# Patient Record
Sex: FEMALE | Race: WHITE | NOT HISPANIC OR LATINO | Employment: UNEMPLOYED | URBAN - METROPOLITAN AREA
[De-identification: names, ages, dates, MRNs, and addresses within clinical notes are randomized per-mention and may not be internally consistent; named-entity substitution may affect disease eponyms.]

---

## 2021-05-18 PROBLEM — O09.512 AMA (ADVANCED MATERNAL AGE) PRIMIGRAVIDA 35+, SECOND TRIMESTER: Status: ACTIVE | Noted: 2021-05-18

## 2021-09-13 PROBLEM — N89.8 VAGINAL DISCHARGE DURING PREGNANCY: Status: ACTIVE | Noted: 2021-09-13

## 2021-09-13 PROBLEM — O26.899 VAGINAL DISCHARGE DURING PREGNANCY: Status: ACTIVE | Noted: 2021-09-13

## 2021-09-15 PROBLEM — O42.90 LEAKAGE OF AMNIOTIC FLUID: Status: ACTIVE | Noted: 2021-09-13

## 2024-01-12 DIAGNOSIS — O09.512 AMA (ADVANCED MATERNAL AGE) PRIMIGRAVIDA 35+, SECOND TRIMESTER: Primary | ICD-10-CM

## 2024-03-08 ENCOUNTER — OFFICE VISIT (OUTPATIENT)
Dept: URGENT CARE | Facility: CLINIC | Age: 39
End: 2024-03-08
Payer: COMMERCIAL

## 2024-03-08 VITALS
TEMPERATURE: 98 F | RESPIRATION RATE: 16 BRPM | HEART RATE: 88 BPM | HEIGHT: 62 IN | SYSTOLIC BLOOD PRESSURE: 106 MMHG | DIASTOLIC BLOOD PRESSURE: 62 MMHG | OXYGEN SATURATION: 98 % | BODY MASS INDEX: 27.6 KG/M2 | WEIGHT: 150 LBS

## 2024-03-08 DIAGNOSIS — J02.9 SORE THROAT: Primary | ICD-10-CM

## 2024-03-08 DIAGNOSIS — J01.90 ACUTE BACTERIAL SINUSITIS: ICD-10-CM

## 2024-03-08 DIAGNOSIS — Z3A.36 36 WEEKS GESTATION OF PREGNANCY: ICD-10-CM

## 2024-03-08 DIAGNOSIS — R05.1 ACUTE COUGH: ICD-10-CM

## 2024-03-08 DIAGNOSIS — B96.89 ACUTE BACTERIAL SINUSITIS: ICD-10-CM

## 2024-03-08 PROBLEM — Z34.90 PREGNANCY: Status: ACTIVE | Noted: 2021-01-18

## 2024-03-08 PROBLEM — B00.1 HERPES LABIALIS: Status: ACTIVE | Noted: 2021-05-17

## 2024-03-08 LAB
CTP QC/QA: YES
FLUAV AG NPH QL: NEGATIVE
FLUBV AG NPH QL: NEGATIVE
S PYO RRNA THROAT QL PROBE: NEGATIVE
SARS-COV-2 AG RESP QL IA.RAPID: NEGATIVE

## 2024-03-08 PROCEDURE — 99204 OFFICE O/P NEW MOD 45 MIN: CPT | Mod: S$GLB,,, | Performed by: NURSE PRACTITIONER

## 2024-03-08 PROCEDURE — 87804 INFLUENZA ASSAY W/OPTIC: CPT | Mod: QW,,, | Performed by: NURSE PRACTITIONER

## 2024-03-08 PROCEDURE — 87811 SARS-COV-2 COVID19 W/OPTIC: CPT | Mod: QW,S$GLB,, | Performed by: NURSE PRACTITIONER

## 2024-03-08 PROCEDURE — 87880 STREP A ASSAY W/OPTIC: CPT | Mod: QW,,, | Performed by: NURSE PRACTITIONER

## 2024-03-08 RX ORDER — AMOXICILLIN 875 MG/1
875 TABLET, FILM COATED ORAL EVERY 12 HOURS
Qty: 20 TABLET | Refills: 0 | Status: SHIPPED | OUTPATIENT
Start: 2024-03-08 | End: 2024-03-18

## 2024-03-08 NOTE — PROGRESS NOTES
"Subjective:      Patient ID: Brittany Serpas Favre is a 39 y.o. female.    Vitals:  height is 5' 2" (1.575 m) and weight is 68 kg (150 lb). Her temperature is 98.1 °F (36.7 °C). Her blood pressure is 106/62 and her pulse is 88. Her respiration is 16 and oxygen saturation is 98%.     Chief Complaint: Sore Throat    Onset symptoms 2 weeks      Sore Throat   This is a new problem. Episode onset: x 2 days. The problem has been unchanged. Associated symptoms include congestion (Right greater than left) and coughing. Pertinent negatives include no ear discharge, ear pain (Congestion) or shortness of breath. She has tried acetaminophen for the symptoms. The treatment provided no relief.       Constitution: Negative for appetite change, chills, fatigue and fever.   HENT:  Positive for congestion (Right greater than left) and sore throat. Negative for ear pain (Congestion) and ear discharge.    Respiratory:  Positive for cough. Negative for chest tightness, sputum production, shortness of breath and wheezing.    Skin:  Negative for rash.      Objective:     Physical Exam   Constitutional: She is oriented to person, place, and time. She is cooperative.  Non-toxic appearance. She does not appear ill. No distress. awake  HENT:   Head: Normocephalic and atraumatic.   Ears:   Right Ear: Tympanic membrane, external ear and ear canal normal.   Left Ear: Tympanic membrane, external ear and ear canal normal.   Nose: Rhinorrhea and congestion present.   Mouth/Throat: Mucous membranes are moist. Posterior oropharyngeal erythema present. No oropharyngeal exudate.   Eyes: Conjunctivae are normal. Right eye exhibits no discharge. Left eye exhibits no discharge.   Neck: Neck supple. No neck rigidity present.   Cardiovascular: Normal rate, regular rhythm and normal heart sounds.   Pulmonary/Chest: Effort normal and breath sounds normal. No accessory muscle usage. No tachypnea. No respiratory distress. She has no wheezes. She has no rhonchi. " She has no rales. She exhibits no tenderness.   Abdominal: Normal appearance.      Comments: Gravid abdomen at 36 weeks   Musculoskeletal:      Cervical back: She exhibits no tenderness.   Lymphadenopathy:     She has no cervical adenopathy.   Neurological: no focal deficit. She is alert and oriented to person, place, and time. No sensory deficit.   Skin: Skin is warm, dry, not diaphoretic and no rash. Capillary refill takes 2 to 3 seconds.   Psychiatric: Her behavior is normal. Mood normal.   Nursing note and vitals reviewed.      Assessment:     1. Sore throat    2. Acute cough    3. 36 weeks gestation of pregnancy    4. Acute bacterial sinusitis      COVID negative  Strep A negative  Flu a/B negative    Plan:       Sore throat  -     SARS Coronavirus 2 Antigen, POCT Manual Read  -     POCT rapid strep A  -     POCT Influenza A/B Rapid Antigen    Acute cough    36 weeks gestation of pregnancy    Acute bacterial sinusitis  -     amoxicillin (AMOXIL) 875 MG tablet; Take 1 tablet (875 mg total) by mouth every 12 (twelve) hours. for 10 days  Dispense: 20 tablet; Refill: 0

## 2024-03-08 NOTE — PATIENT INSTRUCTIONS
Amoxicillin twice a day for 10 days  Please take a probiotic while you are on antibiotics plus a few weeks. Examples of probiotics include brand names such as Align, Floraster and Culturelle, but generic versions are ok too. Kefir is a milk product that is also an excellent probiotic and can be taken as 1/4 cup three times a day with meals.      Guaifenesin/plain Mucinex in the blue box twice a day for 10 days  Tylenol over-the-counter as directed for pain/discomfort.    Contact your obstetrician for any other medications that you may desire to take before you take them to find out if safe in pregnancy  Nasal saline spray

## 2024-05-02 ENCOUNTER — PATIENT MESSAGE (OUTPATIENT)
Dept: URGENT CARE | Facility: CLINIC | Age: 39
End: 2024-05-02
Payer: COMMERCIAL

## 2025-05-23 ENCOUNTER — HOSPITAL ENCOUNTER (OUTPATIENT)
Dept: RADIOLOGY | Facility: CLINIC | Age: 40
Discharge: HOME | End: 2025-05-23
Attending: PODIATRIST
Payer: COMMERCIAL

## 2025-05-23 ENCOUNTER — OFFICE VISIT (OUTPATIENT)
Dept: PODIATRY | Facility: CLINIC | Age: 40
End: 2025-05-23
Payer: COMMERCIAL

## 2025-05-23 VITALS — WEIGHT: 110.69 LBS | RESPIRATION RATE: 18 BRPM | BODY MASS INDEX: 20.37 KG/M2 | HEIGHT: 62 IN

## 2025-05-23 DIAGNOSIS — M79.671 RIGHT FOOT PAIN: ICD-10-CM

## 2025-05-23 DIAGNOSIS — M21.622 TAILOR'S BUNION OF BOTH FEET: ICD-10-CM

## 2025-05-23 DIAGNOSIS — M21.621 TAILOR'S BUNION OF BOTH FEET: ICD-10-CM

## 2025-05-23 DIAGNOSIS — M21.612 BILATERAL BUNIONS: Primary | ICD-10-CM

## 2025-05-23 DIAGNOSIS — M21.611 BILATERAL BUNIONS: Primary | ICD-10-CM

## 2025-05-23 PROCEDURE — 1159F MED LIST DOCD IN RCRD: CPT | Mod: CPTII,S$GLB,, | Performed by: PODIATRIST

## 2025-05-23 PROCEDURE — 99999 PR PBB SHADOW E&M-EST. PATIENT-LVL III: CPT | Mod: PBBFAC,,, | Performed by: PODIATRIST

## 2025-05-23 PROCEDURE — 73630 X-RAY EXAM OF FOOT: CPT | Mod: RT,S$GLB,, | Performed by: RADIOLOGY

## 2025-05-23 PROCEDURE — 3008F BODY MASS INDEX DOCD: CPT | Mod: CPTII,S$GLB,, | Performed by: PODIATRIST

## 2025-05-23 PROCEDURE — 99203 OFFICE O/P NEW LOW 30 MIN: CPT | Mod: S$GLB,,, | Performed by: PODIATRIST

## 2025-05-23 PROCEDURE — 1160F RVW MEDS BY RX/DR IN RCRD: CPT | Mod: CPTII,S$GLB,, | Performed by: PODIATRIST

## 2025-05-23 RX ORDER — VALACYCLOVIR HYDROCHLORIDE 1 G/1
1000 TABLET, FILM COATED ORAL 3 TIMES DAILY
COMMUNITY

## 2025-05-23 NOTE — PROGRESS NOTES
"  [address] [facility] [address] [place] [address]  Phone: [phone]   [phone]    Patient's PCP:Siobhan, Primary Doctor  Referring Provider: Aaarefsantosh Self    Subjective:      Chief Complaint:: Bunions (Bilateral bunions right foot pain)    HPI Brittany Serpas Favre is a 40 y.o. female who presents today with a complaint of bilateral bunions worse on right foot. The current episode started three toe four moths pain increasing over the last month.  The symptoms include bone protrusion base of great toes, pain ranging from aching to sharp throbbing.  The symptoms are aggravated by time of day, prolonged use and shoe gear. The problem has worsened. Treatment to date have included spacers and new shoes which provided some relief.       Vitals:    25 0922   Resp: 18   Weight: 50.2 kg (110 lb 10.7 oz)   Height: 5' 2" (1.575 m)   PainSc:   7      Shoe Size: 6.5    Past Surgical History:   Procedure Laterality Date     SECTION N/A 2024    Procedure:  SECTION;  Surgeon: Kirby Oneal MD;  Location: [facility] L&D;  Service: OB/GYN;  Laterality: N/A;     Past Medical History:   Diagnosis Date    PPH (postpartum hemorrhage)      No family history on file.     Social History:   Marital Status:   Alcohol History:  reports that she does not currently use alcohol.  Tobacco History:  reports that she has never smoked. She has never used smokeless tobacco.  Drug History:  reports no history of drug use.    Review of patient's allergies indicates:  No Known Allergies    Current Medications[1]    Review of Systems   Constitutional:  Negative for chills, fatigue, fever and unexpected weight change.   HENT:  Negative for hearing loss and trouble swallowing.    Eyes:  Negative for photophobia and visual disturbance.   Respiratory:  Negative for cough, shortness of breath and wheezing.    Cardiovascular:  Negative for chest pain, palpitations and leg swelling.   Gastrointestinal:  Negative for " abdominal pain and nausea.   Genitourinary:  Negative for dysuria and frequency.   Musculoskeletal:  Negative for arthralgias, back pain, gait problem, joint swelling and myalgias.   Skin:  Negative for rash.   Neurological:  Negative for tremors, seizures, speech difficulty, weakness and headaches.   Hematological:  Does not bruise/bleed easily.         Objective:        Physical Exam:   Foot Exam    General  General Appearance: appears stated age and healthy   Orientation: alert and oriented to person, place, and time   Affect: appropriate   Gait: unimpaired       Right Foot/Ankle     Inspection and Palpation  Ecchymosis: none  Tenderness: great toe metatarsophalangeal joint   Swelling: none   Arch: normal  Hallux valgus: yes (Moderate)  Skin Exam: skin intact;   Neurovascular  Dorsalis pedis: 2+  Posterior tibial: 2+  Capillary Refill: 2+  Varicose veins: not present  Saphenous nerve sensation: normal  Tibial nerve sensation: normal  Superficial peroneal nerve sensation: normal  Deep peroneal nerve sensation: normal  Sural nerve sensation: normal    Edema  Type of edema: non-pitting    Muscle Strength  Ankle dorsiflexion: 5  Ankle plantar flexion: 5  Ankle inversion: 5  Ankle eversion: 5  Great toe extension: 5  Great toe flexion: 5    Range of Motion    Normal right ankle ROM    Tests  Anterior drawer: negative   Talar tilt: negative   PT Tinel's sign: negative    Paresthesia: negative  Comments  Moderate bunion deformity.  Great toe is laterally deviated abutting the 2nd toe.  Mildly tender to palpation.  No crepitus.  Not track bound.    Mild tailor's bunion deformity    Left Foot/Ankle      Inspection and Palpation  Ecchymosis: none  Tenderness: great toe metatarsophalangeal joint   Swelling: none   Arch: normal  Hallux valgus: yes (Moderate)  Skin Exam: skin intact;   Neurovascular  Dorsalis pedis: 2+  Posterior tibial: 2+  Capillary refill: 2+  Varicose veins: not present  Saphenous nerve sensation:  normal  Tibial nerve sensation: normal  Superficial peroneal nerve sensation: normal  Deep peroneal nerve sensation: normal  Sural nerve sensation: normal    Edema  Type of edema: non-pitting    Muscle Strength  Ankle dorsiflexion: 5  Ankle plantar flexion: 5  Ankle inversion: 5  Ankle eversion: 5  Great toe extension: 5  Great toe flexion: 5    Range of Motion    Normal left ankle ROM    Tests  Anterior drawer: negative   Talar tilt: negative   PT Tinel's sign: negative  Paresthesia: negative  Comments  Moderate bunion deformity.  Great toe is laterally deviated abutting the 2nd toe.  Mildly tender to palpation.  No crepitus.  Not track bound.    Mild tailor's bunion deformity    Physical Exam  Cardiovascular:      Pulses:           Dorsalis pedis pulses are 2+ on the right side and 2+ on the left side.        Posterior tibial pulses are 2+ on the right side and 2+ on the left side.   Musculoskeletal:      Right foot: Bunion (Moderate) present.      Left foot: Bunion (Moderate) present.               Right Ankle/Foot Exam     Tenderness   The patient is tender to palpation of the great toe metatarsophalangeal joint.    Range of Motion   The patient has normal right ankle ROM.    Comments:  Moderate bunion deformity.  Great toe is laterally deviated abutting the 2nd toe.  Mildly tender to palpation.  No crepitus.  Not track bound.    Mild tailor's bunion deformity    Left Ankle/Foot Exam     Tenderness   The patient is tender to palpation of the great toe metatarsophalangeal joint.    Range of Motion   The patient has normal left ankle ROM.     Comments:  Moderate bunion deformity.  Great toe is laterally deviated abutting the 2nd toe.  Mildly tender to palpation.  No crepitus.  Not track bound.    Mild tailor's bunion deformity      Muscle Strength   Right Lower Extremity   Ankle Dorsiflexion:  5   Plantar flexion:  5/5  Left Lower Extremity   Ankle Dorsiflexion:  5   Plantar flexion:  5/5     Vascular Exam      Right Pulses  Dorsalis Pedis:      2+  Posterior Tibial:      2+        Left Pulses  Dorsalis Pedis:      2+  Posterior Tibial:      2+           Imaging: X-Ray Foot Complete Right  EXAMINATION:  XR FOOT COMPLETE 3 VIEW RIGHT    CLINICAL HISTORY:  . Pain in right foot    TECHNIQUE:  AP, lateral, and oblique views of the right foot were performed.    COMPARISON:  None    FINDINGS:  No displaced fracture.  Hallux valgus.  Preserved joint spaces.    Electronically signed by: Keaton Rothman  Date:    05/23/2025  Time:    09:57               Assessment:       1. Bilateral bunions    2. Right foot pain    3. Tailor's bunion of both feet      Plan:   Bilateral bunions  -     X-Ray Foot Complete Right    Right foot pain  -     X-Ray Foot Complete Right    Tailor's bunion of both feet      Follow up if symptoms worsen or fail to improve.    Procedures        I provided patient education regarding: Bunion deformity and causes. I discussed conservative treatment with shoe modification, pads, treating symptoms with OTC NSAIDs, pads between toes, inserts and pads over the bunion. I explained that conservative treatment is non-curative but may help with pain and slow down the progression of the deformity.     Tailor's bunion: I discussed the deformity of the 5th mpj and 5th metatarsal with curvature of the little toe, prominence of the 5th metatarsal head and widening of the forefoot.  I discussed conservative treatment of wider shoes, NSAIDs, soaks, ice, heat, pads and wider shoes.  I discussed surgical treatment iwth either partial 5th metatarsal head resection, or osteotomy of 5th metatarsal with follow up and possible complications.      Counseling:     I provided patient education verbally regarding:   Patient diagnosis, treatment options, as well as alternatives, risks, and benefits.     This note was created using Dragon voice recognition software that occasionally misinterpreted phrases or words.                       [1]   Current Outpatient Medications   Medication Sig Dispense Refill    ibuprofen (ADVIL,MOTRIN) 800 MG tablet Take 1 tablet (800 mg total) by mouth every 8 (eight) hours. (Patient not taking: Reported on 5/23/2025) 20 tablet 0    oxyCODONE-acetaminophen (PERCOCET)  mg per tablet Take 1 tablet by mouth every 6 (six) hours as needed for Pain. (Patient not taking: Reported on 5/23/2025) 20 tablet 0    prenatal vit/iron fum/folic ac (PRENATAL 1+1 ORAL) Take by mouth. (Patient not taking: Reported on 5/23/2025)      valACYclovir (VALTREX) 1000 MG tablet Take 1,000 mg by mouth 3 (three) times daily.       No current facility-administered medications for this visit.

## 2025-05-26 NOTE — PATIENT INSTRUCTIONS
Bunion    You have a bunion. This is a bony bump at the base of your big toe, along the inside edge of your foot. As the bump gets bigger, it can become red, swollen, and painful with shoe wear.  Bunions may occur if you wear shoes that are too tight and pinch your toes together. High heels may make this worse. In some cases a bunion is due to poor alignment of the foot and ankle. This puts extra weight on the instep of each foot.  Once a bunion forms, it changes the way weight is spread all across your foot. This causes the bunion to get worse over time. The big toe will bend more and more toward the other toes.  A minor bunion can be treated by:  Wearing properly fitting shoes  Using bunion pads  Wearing shoe inserts, called orthotics, to better align the foot and ankle  Physical therapy with ultrasound or whirlpool baths can ease pain, redness, and swelling. Severe cases may require surgery. If you dont treat what is causing the bunion, it may get larger and more painful.  Home care  Limit high heels. These shoes force your foot forward, crowding the toes together.  Switch to comfortable shoes with a wide toe area. Or have your existing shoes stretched by a shoe repair shop.  Avoid shoes that are tight, narrow, or pointed.  If you are flat-footed, using arch supports may help prevent further deformity. The best shoe inserts are the ones custom made by a foot specialist, called a podiatrist, or other healthcare provider.  Put a bunion pad over the bunion to ease pressure of your shoe against the bunion. You can buy these pads at most pharmacies without a prescription  To reduce pain and swelling, apply an ice pack over the injured area for 15 to 20 minutes. Do this every 1 to 2 hours the first day. Keep using ice 3 to 4 times a day until the pain and swelling goes away.  To make an ice pack, put ice cubes in a sealed zip-lock plastic bag. Wrap the bag in a clean, thin towel or cloth. Never put ice or an ice pack  "directly on the skin.  You may use over-the-counter pain medicine to control pain, unless another medicine was prescribed. Talk with your provider before using these medicines if you have chronic liver or kidney disease, or ever had a stomach ulcer or GI (gastrointestinal) bleeding.  Follow-up care  Follow up with a podiatrist or foot doctor, or as advised.  If X-rays were taken, you will be notified of any new findings that may affect your care.  When to seek medical care  Contact your healthcare provider if any of the following occur:  Increasing pain or redness around the base of the big toe  Painful ingrown toenail, with redness and swelling or pus around the nail  Date Last Reviewed: 11/21/2015  © 7872-6063 [employer]. [address] [place] [address], [place] [address]. All rights reserved. This information is not intended as a substitute for professional medical care. Always follow your healthcare professional's instructions.     TAILOR'S BUNION (BUNIONETTE)    What Is a Tailor's Bunion?       Tailors bunion, also called a bunionette, is an enlargement of the fifth metatarsal bone at the base of the little toe. The metatarsals are the five long bones of the foot.    The enlargement that characterizes a tailors bunion occurs at the metatarsal "head," located at the far end of the bone where it meets the toe. Tailor's bunions are not as common as bunions, which occur on the inside of the foot, but both are similar in symptoms and causes.        The symptoms of tailors bunions include redness, swelling, and pain at the site of the enlargement. These symptoms occur when wearing shoes that rub against the enlargement, irritating the soft tissues underneath the skin and producing inflammation.      Why do we call it "tailors bunion"?  The deformity received its name centuries ago, when tailors sat cross legged all day with the outside edge of their feet rubbing on the ground. This constant rubbing led to a " painful bump at the base of the little toe.      Causes of a Tailor's Bunion  Often a tailors bunion is caused by an inherited faulty mechanical structure of the foot. In these cases, changes occur in the foots bony framework that result in the development of an enlargement. The fifth metatarsal bone starts to protrude outward, while the little toe moves inward. This shift creates a bump on the outside of the foot that becomes irritated whenever a shoe presses against it.    Sometimes a tailors bunion is actually a bony spur (an outgrowth of bone) on the side of the fifth metatarsal head. Heredity is the main reason that these spurs develop. Regardless of the cause, the symp toms of a tailors bunion are usually aggravated by wearing shoes that are too narrow in the toe, producing constant rubbing and pressure. In fact, wearing shoes with a tight toe box can make the deformity get progressively worse.      Diagnosis  Tailors bunion is easily diagnosed because the protrusion is visually apparent. X-rays may be ordered to help the foot and ankle surgeon determine the cause and extent of the deformity.          Treatment:  Non-surgical Options    Treatment for tailors bunion typically begins with non-surgical therapies. Your foot and ankle surgeon may select one or more of the following options:  Shoe modifications. Wearing the right kind of shoes is critical. Choose shoes that have a wide toe box, and avoid those with pointed toes or high heels.  Oral medications. Nonsteroidal anti-inflammatory drugs (NSAIDs), such as ibuprofen, may help relieve the pain and inflammation.  Injection therapy. Injections of corticosteroid are commonly used to treat the inflamed tissue around the joint.